# Patient Record
Sex: FEMALE | Race: WHITE | NOT HISPANIC OR LATINO
[De-identification: names, ages, dates, MRNs, and addresses within clinical notes are randomized per-mention and may not be internally consistent; named-entity substitution may affect disease eponyms.]

---

## 2021-07-20 ENCOUNTER — TRANSCRIPTION ENCOUNTER (OUTPATIENT)
Age: 46
End: 2021-07-20

## 2021-12-29 ENCOUNTER — TRANSCRIPTION ENCOUNTER (OUTPATIENT)
Age: 46
End: 2021-12-29

## 2024-01-08 ENCOUNTER — NON-APPOINTMENT (OUTPATIENT)
Age: 49
End: 2024-01-08

## 2024-01-10 ENCOUNTER — APPOINTMENT (OUTPATIENT)
Dept: OTOLARYNGOLOGY | Facility: CLINIC | Age: 49
End: 2024-01-10
Payer: COMMERCIAL

## 2024-01-10 VITALS — BODY MASS INDEX: 19.81 KG/M2 | WEIGHT: 116 LBS | HEIGHT: 64 IN

## 2024-01-10 DIAGNOSIS — Z78.9 OTHER SPECIFIED HEALTH STATUS: ICD-10-CM

## 2024-01-10 DIAGNOSIS — J32.8 OTHER CHRONIC SINUSITIS: ICD-10-CM

## 2024-01-10 DIAGNOSIS — Z82.49 FAMILY HISTORY OF ISCHEMIC HEART DISEASE AND OTHER DISEASES OF THE CIRCULATORY SYSTEM: ICD-10-CM

## 2024-01-10 DIAGNOSIS — Z87.09 PERSONAL HISTORY OF OTHER DISEASES OF THE RESPIRATORY SYSTEM: ICD-10-CM

## 2024-01-10 DIAGNOSIS — Z86.39 PERSONAL HISTORY OF OTHER ENDOCRINE, NUTRITIONAL AND METABOLIC DISEASE: ICD-10-CM

## 2024-01-10 DIAGNOSIS — Z80.9 FAMILY HISTORY OF MALIGNANT NEOPLASM, UNSPECIFIED: ICD-10-CM

## 2024-01-10 PROBLEM — Z00.00 ENCOUNTER FOR PREVENTIVE HEALTH EXAMINATION: Status: ACTIVE | Noted: 2024-01-10

## 2024-01-10 PROCEDURE — 31231 NASAL ENDOSCOPY DX: CPT

## 2024-01-10 PROCEDURE — 99203 OFFICE O/P NEW LOW 30 MIN: CPT | Mod: 25

## 2024-01-10 RX ORDER — LEVOTHYROXINE SODIUM 137 UG/1
TABLET ORAL
Refills: 0 | Status: ACTIVE | COMMUNITY

## 2024-01-10 RX ORDER — LORAZEPAM 2 MG/1
TABLET ORAL
Refills: 0 | Status: ACTIVE | COMMUNITY

## 2024-01-10 RX ORDER — DULOXETINE HYDROCHLORIDE 30 MG/1
CAPSULE, DELAYED RELEASE ORAL
Refills: 0 | Status: ACTIVE | COMMUNITY

## 2024-01-10 RX ORDER — ESTRADIOL 10 UG/1
TABLET, FILM COATED VAGINAL
Refills: 0 | Status: ACTIVE | COMMUNITY

## 2024-01-10 NOTE — REASON FOR VISIT
[Initial Evaluation] : an initial evaluation for [FreeTextEntry2] : left ear pressure, facial pain and tearing

## 2024-01-10 NOTE — HISTORY OF PRESENT ILLNESS
[de-identified] : JIMMY SANTOS is a 48 year old patient Here for a 2-month history of left ear pressure/sensation of eustachian tube dysfunction, left facial pain, and left eye tearing.  She may have borderline left ear pain.  She also has tonsil stones.  She said that when she gargles with salt water, she sometimes elicits the pain.  Her symptoms started around the time that she may have had an upper respiratory tract infection or sinus infection.  She was placed on Augmentin at the end of October for sinus infection.  She had COVID in December which had cold-like symptoms.  She is not having a lot of nasal obstruction, nasal congestion, nasal drainage.  She tried Flonase but it did not help.  She did see an ENT for evaluation.  Audiogram was normal.  She had type a tympanograms  She has a history of seasonal allergies.  She had testing in the past She does not have recurrent sinusitis She had septorhinoplasty when she was in her 20s She has no history of reflux She does have a history of migraines She denies a history of TMJ dysfunction

## 2024-01-10 NOTE — ASSESSMENT
[FreeTextEntry1] : She has a 2-month history of left ear pressure/sensation of eustachian tube dysfunction, left facial pain, tearing from her left eye, and occasional left ear pain.  Her ears were normal on exam.  A recent audiogram was also normal.  Nasal endoscopy showed a deviated septum but was otherwise unremarkable.  We discussed possible etiologies for the pain including chronic sinusitis, TMJ dysfunction, migraines, cluster headaches, and trigeminal neuralgia.  Plan -Findings and management options were discussed with the patient. -Nasal saline irrigations and allergy/sinus medications as needed -I did recommend reflux precautions as she had some evidence of reflux on exam.  She was given literature -I recommended that she speak with her dentist to see if she does have TMJ dysfunction or dental source.  She was given literature-regarding TMJ dysfunction -I am sending her for a CT scan of the sinuses to evaluate for chronic sinusitis -Consider neurology evaluation -She may use salt water gargles as needed for the tonsil stones -I will see her back after the CT scan. -She was asked to call or return earlier if she has any problems or worsening symptom

## 2024-01-10 NOTE — CONSULT LETTER
[Dear  ___] : Dear  [unfilled], [Consult Letter:] : I had the pleasure of evaluating your patient, [unfilled]. [Please see my note below.] : Please see my note below. [Consult Closing:] : Thank you very much for allowing me to participate in the care of this patient.  If you have any questions, please do not hesitate to contact me. [Sincerely,] : Sincerely, [FreeTextEntry3] : Meredith Dumont MD [facility] at [facility] Otolaryngology  Head & Neck Surgery [facility]   Department of Otolaryngology [facility] [employer] [facility] at [facility]  Office Tel: [phone]

## 2024-01-10 NOTE — PHYSICAL EXAM
[TextEntry] : PHYSICAL EXAM  General: The patient was alert, oriented and in no distress. Voice was clear.  Face: The patient had no facial asymmetry or mass. The skin was unremarkable.  Ears: Hearing normal to conversational voice External ears were normal without deformity. Ear canals were clear. No cerumen or inflammation Tympanic membranes were intact and normal. No perforation or effusion. mobile  Nose:  The external nose had no significant deformity.  There was no facial tenderness.  On anterior rhinoscopy, the nasal mucosa was normal.   The anterior septum was grossly midline. There were no visualized polyps, purulence  or masses.   Oral cavity: Oral mucosa- normal. Oral and base of tongue- clear and without mass. Gingival and buccal mucosa- moist and without lesions. Palate- the palate moved well. There was no cleft palate. There appeared to be good salivary flow.   Oral cavity/oropharynx- no pus, erythema or mass She had 1+ tonsils.  The tonsils were evaluated under the microscope.  There was no obvious tonsil stone or lesion.  Neck:  The neck was symmetrical. The parotid and submandibular glands were normal without masses. The trachea was midline and there was no unusual crepitus. Thyroid was smooth and nontender and no masses were palpated. No masses  Lymphatics: Cervical adenopathy- none.

## 2024-01-18 ENCOUNTER — OUTPATIENT (OUTPATIENT)
Dept: OUTPATIENT SERVICES | Facility: HOSPITAL | Age: 49
LOS: 1 days | End: 2024-01-18
Payer: COMMERCIAL

## 2024-01-18 ENCOUNTER — APPOINTMENT (OUTPATIENT)
Dept: CT IMAGING | Facility: HOSPITAL | Age: 49
End: 2024-01-18
Payer: COMMERCIAL

## 2024-01-18 PROCEDURE — 70486 CT MAXILLOFACIAL W/O DYE: CPT

## 2024-01-18 PROCEDURE — 70486 CT MAXILLOFACIAL W/O DYE: CPT | Mod: 26

## 2024-01-25 ENCOUNTER — APPOINTMENT (OUTPATIENT)
Dept: OTOLARYNGOLOGY | Facility: CLINIC | Age: 49
End: 2024-01-25
Payer: COMMERCIAL

## 2024-01-25 DIAGNOSIS — R51.9 HEADACHE, UNSPECIFIED: ICD-10-CM

## 2024-01-25 DIAGNOSIS — M26.609 UNSPECIFIED TEMPOROMANDIBULAR JOINT DISORDER: ICD-10-CM

## 2024-01-25 DIAGNOSIS — K21.9 GASTRO-ESOPHAGEAL REFLUX DISEASE W/OUT ESOPHAGITIS: ICD-10-CM

## 2024-01-25 DIAGNOSIS — H93.8X2 OTHER SPECIFIED DISORDERS OF LEFT EAR: ICD-10-CM

## 2024-01-25 PROCEDURE — 99441: CPT

## 2024-01-25 NOTE — ASSESSMENT
[FreeTextEntry1] : She has a history of left ear pressure, facial pain, and tearing from her eye.  Her symptoms are little bit better.  CT scan was negative for sinus disease.  Plan -Findings and management options were discussed with the patient. -Allergy and sinus medications as needed -She has an appointment to see her dentist to see if she could have TMJ dysfunction. -I recommended neurology evaluation -I have asked her to call me after the evaluations and let me know how she is doing -She should call and return if she has any problems prior to that

## 2024-01-25 NOTE — HISTORY OF PRESENT ILLNESS
[de-identified] : Telehealth visit.  There are limitations of telemedicine encounters including the risks associated with the technology platform and lack of a physical exam.  The patient may need further testing and work up to arrive at a diagnosis and treatment plan.  The patient was made aware when the appointment was scheduled that this will be billed as a visit.  The patient understood and elected to proceed.  JIMMY SANTOS is a 48 year old patient With a history of left ear pressure/sensation tube dysfunction, facial pain, and eye tearing.  She had a CT scan of her sinuses.  We reviewed the results.  The CT scan showed clear sinuses.  She had a deviated septum to the right.  She said that she has been away on vacation.  She is feeling better.  She thinks it could be due to TMJ dysfunction  ENT history She has a history of seasonal allergies. She had testing in the past She does not have recurrent sinusitis She had septorhinoplasty when she was in her 20s She has no history of reflux She does have a history of migraines Audiogram was normal. She had type a tympanograms NE/FL- DNS and laryngeal changes suggestive of reflux

## 2024-01-31 ENCOUNTER — APPOINTMENT (OUTPATIENT)
Dept: OTOLARYNGOLOGY | Facility: CLINIC | Age: 49
End: 2024-01-31